# Patient Record
Sex: MALE | Race: BLACK OR AFRICAN AMERICAN
[De-identification: names, ages, dates, MRNs, and addresses within clinical notes are randomized per-mention and may not be internally consistent; named-entity substitution may affect disease eponyms.]

---

## 2019-02-25 ENCOUNTER — HOSPITAL ENCOUNTER (EMERGENCY)
Dept: HOSPITAL 61 - ER | Age: 24
Discharge: HOME | End: 2019-02-25
Payer: COMMERCIAL

## 2019-02-25 VITALS — SYSTOLIC BLOOD PRESSURE: 140 MMHG | DIASTOLIC BLOOD PRESSURE: 84 MMHG

## 2019-02-25 VITALS — HEIGHT: 60 IN | WEIGHT: 102 LBS | BODY MASS INDEX: 20.03 KG/M2

## 2019-02-25 DIAGNOSIS — R07.89: Primary | ICD-10-CM

## 2019-02-25 DIAGNOSIS — K21.9: ICD-10-CM

## 2019-02-25 LAB
ALBUMIN SERPL-MCNC: 4.4 G/DL (ref 3.4–5)
ALBUMIN/GLOB SERPL: 1 {RATIO} (ref 1–1.7)
ALP SERPL-CCNC: 79 U/L (ref 46–116)
ALT SERPL-CCNC: 24 U/L (ref 16–63)
ANION GAP SERPL CALC-SCNC: 10 MMOL/L (ref 6–14)
AST SERPL-CCNC: 28 U/L (ref 15–37)
BASOPHILS # BLD AUTO: 0 X10^3/UL (ref 0–0.2)
BASOPHILS NFR BLD: 1 % (ref 0–3)
BILIRUB SERPL-MCNC: 0.4 MG/DL (ref 0.2–1)
BUN SERPL-MCNC: 6 MG/DL (ref 8–26)
BUN/CREAT SERPL: 9 (ref 6–20)
CALCIUM SERPL-MCNC: 9.7 MG/DL (ref 8.5–10.1)
CHLORIDE SERPL-SCNC: 102 MMOL/L (ref 98–107)
CO2 SERPL-SCNC: 27 MMOL/L (ref 21–32)
CREAT SERPL-MCNC: 0.7 MG/DL (ref 0.7–1.3)
EOSINOPHIL NFR BLD: 0.2 X10^3/UL (ref 0–0.7)
EOSINOPHIL NFR BLD: 5 % (ref 0–3)
ERYTHROCYTE [DISTWIDTH] IN BLOOD BY AUTOMATED COUNT: 12 % (ref 11.5–14.5)
GFR SERPLBLD BASED ON 1.73 SQ M-ARVRAT: 166.3 ML/MIN
GLOBULIN SER-MCNC: 4.2 G/DL (ref 2.2–3.8)
GLUCOSE SERPL-MCNC: 88 MG/DL (ref 70–99)
HCT VFR BLD CALC: 43.6 % (ref 39–53)
HGB BLD-MCNC: 14.8 G/DL (ref 13–17.5)
LIPASE: 95 U/L (ref 73–393)
LYMPHOCYTES # BLD: 1.8 X10^3/UL (ref 1–4.8)
LYMPHOCYTES NFR BLD AUTO: 55 % (ref 24–48)
MCH RBC QN AUTO: 30 PG (ref 25–35)
MCHC RBC AUTO-ENTMCNC: 34 G/DL (ref 31–37)
MCV RBC AUTO: 88 FL (ref 79–100)
MONO #: 0.2 X10^3/UL (ref 0–1.1)
MONOCYTES NFR BLD: 8 % (ref 0–9)
NEUT #: 1 X10^3UL (ref 1.8–7.7)
NEUTROPHILS NFR BLD AUTO: 31 % (ref 31–73)
PLATELET # BLD AUTO: 235 X10^3/UL (ref 140–400)
POTASSIUM SERPL-SCNC: 4 MMOL/L (ref 3.5–5.1)
PROT SERPL-MCNC: 8.6 G/DL (ref 6.4–8.2)
RBC # BLD AUTO: 4.97 X10^6/UL (ref 4.3–5.7)
SODIUM SERPL-SCNC: 139 MMOL/L (ref 136–145)
WBC # BLD AUTO: 3.3 X10^3/UL (ref 4–11)

## 2019-02-25 PROCEDURE — 83690 ASSAY OF LIPASE: CPT

## 2019-02-25 PROCEDURE — 84484 ASSAY OF TROPONIN QUANT: CPT

## 2019-02-25 PROCEDURE — 85025 COMPLETE CBC W/AUTO DIFF WBC: CPT

## 2019-02-25 PROCEDURE — 99284 EMERGENCY DEPT VISIT MOD MDM: CPT

## 2019-02-25 PROCEDURE — 80053 COMPREHEN METABOLIC PANEL: CPT

## 2019-02-25 PROCEDURE — 71045 X-RAY EXAM CHEST 1 VIEW: CPT

## 2019-02-25 PROCEDURE — 93005 ELECTROCARDIOGRAM TRACING: CPT

## 2019-02-25 PROCEDURE — 36415 COLL VENOUS BLD VENIPUNCTURE: CPT

## 2019-02-25 NOTE — RAD
PROCEDURE: PORTABLE CHEST 1V

 

CLINICAL INDICATION: MID-STERNAL CHEST PAIN X1 MONTH

 

COMPARISON: None

 

FINDINGS:  

No pneumothorax identified. Cardiac and mediastinal contours unremarkable.

No pulmonary consolidation or acute airspace disease. No acute osseous 

abnormalities identified. 

 

IMPRESSION:

No pulmonary consolidation or acute airspace disease. 

 

 

 

Electronically signed by: Harvey Claudio DO (2/25/2019 9:17 PM) [facility]

## 2019-02-26 NOTE — EKG
[facility]

              [address] [place] [address]

Test Date:    2019               Test Time:    20:00:43

Pat Name:     RENY BENTLEY          Department:   

Patient ID:   [id number]           Room:          

Gender:       M                        Technician:   

:          1995               Requested By: FRENCH AVALOS

Order Number: [id number]           Reading MD:   Lartell Peters

                                 Measurements

Intervals                              Axis          

Rate:         60                       P:            47

RI:           156                      QRS:          14

QRSD:         82                       T:            27

QT:           368                                    

QTc:          368                                    

                           Interpretive Statements

SINUS RHYTHM



Electronically Signed On 3-5-2019 10:51:30 CST by Latrell Peters

## 2021-03-24 ENCOUNTER — HOSPITAL ENCOUNTER (EMERGENCY)
Dept: HOSPITAL 61 - ER | Age: 26
Discharge: HOME | End: 2021-03-24
Payer: SELF-PAY

## 2021-03-24 VITALS — WEIGHT: 103.62 LBS | HEIGHT: 60 IN | BODY MASS INDEX: 20.34 KG/M2

## 2021-03-24 VITALS — SYSTOLIC BLOOD PRESSURE: 125 MMHG | DIASTOLIC BLOOD PRESSURE: 78 MMHG

## 2021-03-24 DIAGNOSIS — R19.7: ICD-10-CM

## 2021-03-24 DIAGNOSIS — Z98.890: ICD-10-CM

## 2021-03-24 DIAGNOSIS — R10.31: ICD-10-CM

## 2021-03-24 DIAGNOSIS — R10.32: Primary | ICD-10-CM

## 2021-03-24 DIAGNOSIS — Z87.442: ICD-10-CM

## 2021-03-24 DIAGNOSIS — K21.9: ICD-10-CM

## 2021-03-24 LAB
AMORPH SED URNS QL MICRO: PRESENT /HPF
ANION GAP SERPL CALC-SCNC: 6 MMOL/L (ref 6–14)
APTT PPP: YELLOW S
BACTERIA #/AREA URNS HPF: 0 /HPF
BASOPHILS # BLD AUTO: 0 X10^3/UL (ref 0–0.2)
BASOPHILS NFR BLD: 1 % (ref 0–3)
BILIRUB UR QL STRIP: NEGATIVE
BUN SERPL-MCNC: 5 MG/DL (ref 8–26)
CALCIUM SERPL-MCNC: 9.1 MG/DL (ref 8.5–10.1)
CHLORIDE SERPL-SCNC: 105 MMOL/L (ref 98–107)
CO2 SERPL-SCNC: 27 MMOL/L (ref 21–32)
CREAT SERPL-MCNC: 0.8 MG/DL (ref 0.7–1.3)
EOSINOPHIL NFR BLD: 0.2 X10^3/UL (ref 0–0.7)
EOSINOPHIL NFR BLD: 5 % (ref 0–3)
ERYTHROCYTE [DISTWIDTH] IN BLOOD BY AUTOMATED COUNT: 11.6 % (ref 11.5–14.5)
FIBRINOGEN PPP-MCNC: (no result) MG/DL
GFR SERPLBLD BASED ON 1.73 SQ M-ARVRAT: 140.3 ML/MIN
GLUCOSE SERPL-MCNC: 79 MG/DL (ref 70–99)
HCT VFR BLD CALC: 39.9 % (ref 39–53)
HGB BLD-MCNC: 13.6 G/DL (ref 13–17.5)
LYMPHOCYTES # BLD: 2 X10^3/UL (ref 1–4.8)
LYMPHOCYTES NFR BLD AUTO: 53 % (ref 24–48)
MCH RBC QN AUTO: 31 PG (ref 25–35)
MCHC RBC AUTO-ENTMCNC: 34 G/DL (ref 31–37)
MCV RBC AUTO: 89 FL (ref 79–100)
MONO #: 0.3 X10^3/UL (ref 0–1.1)
MONOCYTES NFR BLD: 8 % (ref 0–9)
NEUT #: 1.3 X10^3/UL (ref 1.8–7.7)
NEUTROPHILS NFR BLD AUTO: 33 % (ref 31–73)
NITRITE UR QL STRIP: NEGATIVE
PH UR STRIP: 7 [PH]
PLATELET # BLD AUTO: 183 X10^3/UL (ref 140–400)
POTASSIUM SERPL-SCNC: 4 MMOL/L (ref 3.5–5.1)
PROT UR STRIP-MCNC: NEGATIVE MG/DL
RBC # BLD AUTO: 4.47 X10^6/UL (ref 4.3–5.7)
RBC #/AREA URNS HPF: 0 /HPF (ref 0–2)
SODIUM SERPL-SCNC: 138 MMOL/L (ref 136–145)
UROBILINOGEN UR-MCNC: 1 MG/DL
WBC # BLD AUTO: 3.8 X10^3/UL (ref 4–11)
WBC #/AREA URNS HPF: 0 /HPF (ref 0–4)

## 2021-03-24 PROCEDURE — 85025 COMPLETE CBC W/AUTO DIFF WBC: CPT

## 2021-03-24 PROCEDURE — 74176 CT ABD & PELVIS W/O CONTRAST: CPT

## 2021-03-24 PROCEDURE — 99285 EMERGENCY DEPT VISIT HI MDM: CPT

## 2021-03-24 PROCEDURE — 80048 BASIC METABOLIC PNL TOTAL CA: CPT

## 2021-03-24 PROCEDURE — 81001 URINALYSIS AUTO W/SCOPE: CPT

## 2021-03-24 PROCEDURE — 36415 COLL VENOUS BLD VENIPUNCTURE: CPT

## 2021-03-24 NOTE — RAD
INDICATION: Reason: lower abd pain / Spl. Instructions:  / History: .  



COMPARISON: None.



TECHNIQUE:



Axial CT images obtained through the abdomen and pelvis without contrast.



One or more of the following individualized dose reduction techniques were utilized for this examinat
ion:  1. Automated exposure control;  2. Adjustment of the mA and/or kV according to patient size;  3
. Use of iterative reconstruction technique.



FINDINGS:



There is some mild bronchiectasis at the lung bases.

4 mm nodule right lung base typically benign in a patient of this age.

The abdominal aorta is largely obscured secondary to lack of adjacent fat and lack of contrast.

No intrahepatic bile duct dilation.

Poor evaluation of pancreas without contrast.

Spleen is not enlarged. 4 mm left renal stone. No hydronephrosis.

Urinary bladder partially distended.

The appendix is likely partially seen with air within the lumen and does not appear significantly dil
ated in the visualized portion. Part of it is obscured secondary to numerous loops of unopacified bow
el within the region

No evidence of transition point within the bowel to suggest obstruction.

Degenerative changes of the spine. Multilevel central canal and neural foraminal stenosis. There is s
ome dysmorphic changes of the bilateral hips which could be congenital.

Dysmorphic changes throughout the thoracic and lumbar spine could be from congenital etiology with we
dging of vertebral bodies. Multilevel central canal and neural foraminal stenosis with osteophyte for
mation as well as disc protrusions and facet hypertrophy.



IMPRESSION:



*  No hydronephrosis.



*  Partially visualized appendix does not appear grossly inflamed.



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 There is also degenerative ch
anges with multilevel central canal and neural foraminal stenosis.



Electronically signed by: Donavon aGrcia MD (3/24/2021 7:31 PM) [id number]

## 2024-07-08 NOTE — ED.ADGEN
Chief Complaint   Patient presents with    Irregular Heart Beat     denies chest pain or SOB     1. Have you been to the ER, urgent care clinic since your last visit? Hospitalized since your last visit? No    2. Have you seen or consulted any other health care providers outside of the [address] [place] [address] since your last visit? Include any pap smears or colon screening.  No Past Medical History


Past Medical History:  GERD, Kidney Stone, Other


Additional Past Medical Histor:  ACID REFLUX


Past Surgical History:  Other


Additional Past Surgical Histo:  BACK


Smoking Status:  Never Smoker


Alcohol Use:  None


Drug Use:  None





General Adult


EDM:


Chief Complaint:  OTHER COMPLAINTS





HPI:


HPI:


Patient is a 25-year-old male who presents to the emergency room complaining of 

lower abdominal pain.  Patient states that it was diffusely over his lower 

abdomen over the last couple of weeks.  He has been having pain and cramping.  

Pain gets somewhat worse with movement or eating.  He states he started having 

these feelings in his bladder today.  He states he feels like he always needs to

urinate and have a bowel movement but nothing comes out.  He initially was able 

to eat normally but now is just eating small amounts.  He states he is try to 

make himself throw up.  He has been having some intermittent diarrhea.  He has 

been having a lot of gas.  He has never had anything like this previously.  

Denies any fevers, chills, sweats, chest pain, shortness of breath, headache.





Review of Systems:


Review of Systems:


Complete ROS is negative unless otherwise documented in HPI





Current Medications:





Current Medications








 Medications


  (Trade)  Dose


 Ordered  Sig/Vilma  Start Time


 Stop Time Status Last Admin


Dose Admin


 


 Hyoscyamine


  (Anaspaz)  0.25 mg  1X  PRN  3/24/21 20:30


     














Allergies:


Allergies:





Allergies








Coded Allergies Type Severity Reaction Last Updated Verified


 


  No Known Drug Allergies    2/6/14 No











Physical Exam:


PE:


General: Awake, alert, NAD. Well Nourished, well hydrated. Cooperative


HEENT: Atraumatic, EOMI, PERRL, airway patent, moist oral mucosa


Neck: Supple, trachea midline


Respiratory: CTA bilaterally, normal effort, no wheezing/crackles


CV: RRR, no murmur, cap refill <2


GI: Soft, nondistended, lower abdominal tenderness bilaterally, no guarding, no 

rebound, no masses


MSK: No obvious deformities


Skin: Warm, dry, intact


Neuro: A&O x3, speech NL, sensory and motor grossly intact, no focal deficits


Psych: Normal affect, normal mood, not suicidal or homicidal





Current Patient Data:


Labs:





                                Laboratory Tests








Test


 3/24/21


17:51 3/24/21


19:51


 


Urine Collection Type Unknown   


 


Urine Color Yellow   


 


Urine Clarity Cloudy   


 


Urine pH


 7.0 (<5.0-8.0)


 





 


Urine Specific Gravity


 1.015


(1.000-1.030) 





 


Urine Protein


 Negative mg/dL


(NEG-TRACE) 





 


Urine Glucose (UA)


 Negative mg/dL


(NEG) 





 


Urine Ketones (Stick)


 Negative mg/dL


(NEG) 





 


Urine Blood


 Negative (NEG)


 





 


Urine Nitrite


 Negative (NEG)


 





 


Urine Bilirubin


 Negative (NEG)


 





 


Urine Urobilinogen Dipstick


 1.0 mg/dL (0.2


mg/dL) 





 


Urine Leukocyte Esterase


 Negative (NEG)


 





 


Urine RBC 0 /HPF (0-2)   


 


Urine WBC 0 /HPF (0-4)   


 


Urine Squamous Epithelial


Cells Occ /LPF  


 





 


Urine Amorphous Sediment Present /HPF   


 


Urine Bacteria


 0 /HPF (0-FEW)


 





 


White Blood Count


 


 3.8 x10^3/uL


(4.0-11.0)  L


 


Red Blood Count


 


 4.47 x10^6/uL


(4.30-5.70)


 


Hemoglobin


 


 13.6 g/dL


(13.0-17.5)


 


Hematocrit


 


 39.9 %


(39.0-53.0)


 


Mean Corpuscular Volume


 


 89 fL ()





 


Mean Corpuscular Hemoglobin  31 pg (25-35)  


 


Mean Corpuscular Hemoglobin


Concent 


 34 g/dL


(31-37)


 


Red Cell Distribution Width


 


 11.6 %


(11.5-14.5)


 


Platelet Count


 


 183 x10^3/uL


(140-400)


 


Neutrophils (%) (Auto)  33 % (31-73)  


 


Lymphocytes (%) (Auto)  53 % (24-48)  H


 


Monocytes (%) (Auto)  8 % (0-9)  


 


Eosinophils (%) (Auto)  5 % (0-3)  H


 


Basophils (%) (Auto)  1 % (0-3)  


 


Neutrophils # (Auto)


 


 1.3 x10^3/uL


(1.8-7.7)  L


 


Lymphocytes # (Auto)


 


 2.0 x10^3/uL


(1.0-4.8)


 


Monocytes # (Auto)


 


 0.3 x10^3/uL


(0.0-1.1)


 


Eosinophils # (Auto)


 


 0.2 x10^3/uL


(0.0-0.7)


 


Basophils # (Auto)


 


 0.0 x10^3/uL


(0.0-0.2)


 


Sodium Level


 


 138 mmol/L


(136-145)


 


Potassium Level


 


 4.0 mmol/L


(3.5-5.1)


 


Chloride Level


 


 105 mmol/L


()


 


Carbon Dioxide Level


 


 27 mmol/L


(21-32)


 


Anion Gap  6 (6-14)  


 


Blood Urea Nitrogen


 


 5 mg/dL (8-26)


L


 


Creatinine


 


 0.8 mg/dL


(0.7-1.3)


 


Estimated GFR


(Cockcroft-Gault) 


 140.3  





 


Glucose Level


 


 79 mg/dL


(70-99)


 


Calcium Level


 


 9.1 mg/dL


(8.5-10.1)





                                Laboratory Tests


3/24/21 19:51








                                Laboratory Tests


3/24/21 19:51











Vital Signs:





                                   Vital Signs








  Date Time  Temp Pulse Resp B/P (MAP) Pulse Ox O2 Delivery O2 Flow Rate FiO2


 


3/24/21 17:51 98.0 70 16 117/79 (92) 100 Room Air  





 98.0       











EKG:


EKG:


[]





Heart Score:


C/O Chest Pain:  N/A


Risk Factors:


Risk Factors:  DM, Current or recent (<one month) smoker, HTN, HLP, family 

history of CAD, obesity.


Risk Scores:


Score 0 - 3:  2.5% MACE over next 6 weeks - Discharge Home


Score 4 - 6:  20.3% MACE over next 6 weeks - Admit for Clinical Observation


Score 7 - 10:  72.7% MACE over next 6 weeks - Early Invasive Strategies





Radiology/Procedures:


Radiology/Procedures:


[]





Course & Med Decision Making:


Course & Med Decision Making


Pertinent Labs and Imaging studies reviewed. (See chart for details)





Patient is 25-year-old male who presents to the emergency room complaining of a 

lower abdominal pain.  UA and it was initially ordered which is negative for 

signs of cystitis.  CT abdomen pelvis without contrast will be ordered to 

evaluate for any inflammation in the colon or kidney stone.  Patient is 

hemodynamically stable and has a benign abdomen that is soft without any signs 

of peritonitis.  Lab work is unremarkable.  CT abdomen pelvis does not show 

acute pathology.  Does not have signs of obstruction.  Will try Levsin and 

Colace to help with pain.  Patient's test results and vitals while in the ED 

were fully reviewed and discussed with the patient. Patient is stable and at 

this time does not need admission to the hospital. We have discussed strict 

return precautions and the importance of following up with their Primary Care 

Physician. Patient stated understanding and was given an opportunity to ask any 

questions. Patient is in agreement with plan.





Dragon Disclaimer:


Dragon Disclaimer:


This electronic medical record was generated, in whole or in part, using a voice

 recognition dictation system.





Departure


Departure


Impression:  


   Primary Impression:  


   Abdominal pain


Disposition:  01 DC HOME SELF CARE/HOMELESS


Condition:  STABLE


Referrals:  


NO PCP (PCP)


Patient Instructions:  Abdominal Pain, Constipation, Adult


Scripts


Sennosides/Docusate Sodium (Colace 2-in-1 Tablet) 1 Each Tablet


1 TAB PO BID for 30 Days, #60 TAB 0 Refills


   Prov: ANURADHA BOYCE MD         3/24/21 


Hyoscyamine Sulfate (LEVSIN-SL) 0.125 Mg Tab.subl


2-Chauncey TAB SL PRN Q4HRS PRN for pain for 5 Days, #60 TAB 0 Refills


   Prov: ANURADHA BOYCE MD         3/24/21











ANURADHA BOYCE MD            Mar 24, 2021 19:07 Dr. [address]. [address]. [address]. [address]. [address]. [address]. Perkins